# Patient Record
Sex: MALE | Race: BLACK OR AFRICAN AMERICAN | NOT HISPANIC OR LATINO | Employment: FULL TIME | URBAN - METROPOLITAN AREA
[De-identification: names, ages, dates, MRNs, and addresses within clinical notes are randomized per-mention and may not be internally consistent; named-entity substitution may affect disease eponyms.]

---

## 2020-11-18 ENCOUNTER — OFFICE VISIT (OUTPATIENT)
Dept: FAMILY MEDICINE | Facility: CLINIC | Age: 30
End: 2020-11-18
Payer: COMMERCIAL

## 2020-11-18 VITALS
OXYGEN SATURATION: 100 % | TEMPERATURE: 97.8 F | BODY MASS INDEX: 24.22 KG/M2 | SYSTOLIC BLOOD PRESSURE: 90 MMHG | RESPIRATION RATE: 18 BRPM | WEIGHT: 163.5 LBS | DIASTOLIC BLOOD PRESSURE: 60 MMHG | HEIGHT: 69 IN | HEART RATE: 40 BPM

## 2020-11-18 DIAGNOSIS — Z23 NEED FOR VACCINATION: Primary | ICD-10-CM

## 2020-11-18 PROCEDURE — 99202 OFFICE O/P NEW SF 15 MIN: CPT | Mod: 25 | Performed by: NURSE PRACTITIONER

## 2020-11-18 PROCEDURE — 90707 MMR VACCINE SC: CPT | Performed by: NURSE PRACTITIONER

## 2020-11-18 PROCEDURE — 90716 VAR VACCINE LIVE SUBQ: CPT | Performed by: NURSE PRACTITIONER

## 2020-11-18 PROCEDURE — 90471 IMMUNIZATION ADMIN: CPT | Performed by: NURSE PRACTITIONER

## 2020-11-18 PROCEDURE — 90472 IMMUNIZATION ADMIN EACH ADD: CPT | Performed by: NURSE PRACTITIONER

## 2020-11-18 RX ORDER — ISONIAZID 300 MG/1
300 TABLET ORAL DAILY
COMMUNITY
Start: 2020-09-18

## 2020-11-18 RX ORDER — PYRIDOXINE HCL (VITAMIN B6) 50 MG
50 TABLET ORAL DAILY
COMMUNITY
Start: 2020-09-18

## 2020-11-18 SDOH — HEALTH STABILITY: MENTAL HEALTH: HOW OFTEN DO YOU HAVE A DRINK CONTAINING ALCOHOL?: NEVER

## 2020-11-18 ASSESSMENT — MIFFLIN-ST. JEOR: SCORE: 1680.39

## 2020-11-18 NOTE — PROGRESS NOTES
"Subjective     Jean Pierre Marrufo is a 30 year old male who presents to clinic today for the following health issues:    HPI         Patient presents with:  Other: need to update shot immunization for work. 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 States that he has had tetanus vaccine about every ten years with last being TDAP in 9/2020. Has had influenza vaccine this season. States that he does not believe that he has had MMR or Varicella vaccines in the past. Does not have a copy of his immunization records.     Review of Systems   Constitutional, HEENT, cardiovascular, pulmonary, gi and gu systems are negative, except as otherwise noted.      Objective    BP 90/60   Pulse (!) 40   Temp 97.8  F (36.6  C) (Oral)   Resp 18   Ht 1.75 m (5' 8.9\")   Wt 74.2 kg (163 lb 8 oz)   SpO2 100%   BMI 24.22 kg/m    Body mass index is 24.22 kg/m .  Physical Exam   GENERAL: healthy, alert and no distress  EYES: Eyes grossly normal to inspection, PERRL and conjunctivae and sclerae normal  PSYCH: mentation appears normal, affect normal/bright    No results found for this or any previous visit (from the past 24 hour(s)).        Assessment & Plan     Need for vaccination  Offered patient titers to check for immunity. Patient declines, states that he does not think has has been vaccinated for MMR or varicella in the past.   - CHICKEN POX VACCINE [61232]  - MMR VIRUS IMMUNIZATION [54365]        Return in about 6 weeks (around 12/30/2020) for ancillary MMR and varicella second dose.    DESHAWN Romano [facility]    "

## 2023-02-10 ENCOUNTER — IMMUNIZATION (OUTPATIENT)
Dept: FAMILY MEDICINE | Facility: CLINIC | Age: 33
End: 2023-02-10
Payer: COMMERCIAL

## 2023-02-10 DIAGNOSIS — Z23 NEED FOR VACCINATION: ICD-10-CM

## 2023-02-10 DIAGNOSIS — Z23 HIGH PRIORITY FOR 2019-NCOV VACCINE: Primary | ICD-10-CM

## 2023-02-10 DIAGNOSIS — Z23 NEED FOR PROPHYLACTIC VACCINATION AND INOCULATION AGAINST INFLUENZA: ICD-10-CM

## 2023-02-10 PROCEDURE — 90471 IMMUNIZATION ADMIN: CPT

## 2023-02-10 PROCEDURE — 99207 PR NO CHARGE LOS: CPT

## 2023-02-10 PROCEDURE — 0011A COVID-19 VACCINE 18+ (MODERNA PRIMARY SERIES .5ML): CPT

## 2023-02-10 PROCEDURE — 91301 COVID-19 VACCINE 18+ (MODERNA PRIMARY SERIES .5ML): CPT

## 2023-02-10 PROCEDURE — 90472 IMMUNIZATION ADMIN EACH ADD: CPT

## 2023-02-10 PROCEDURE — 90686 IIV4 VACC NO PRSV 0.5 ML IM: CPT

## 2023-02-10 PROCEDURE — 90746 HEPB VACCINE 3 DOSE ADULT IM: CPT

## 2023-03-10 ENCOUNTER — IMMUNIZATION (OUTPATIENT)
Dept: FAMILY MEDICINE | Facility: CLINIC | Age: 33
End: 2023-03-10
Payer: COMMERCIAL

## 2023-03-10 DIAGNOSIS — Z23 NEED FOR VACCINATION: ICD-10-CM

## 2023-03-10 DIAGNOSIS — Z23 HIGH PRIORITY FOR 2019-NCOV VACCINE: Primary | ICD-10-CM

## 2023-03-10 PROCEDURE — 90471 IMMUNIZATION ADMIN: CPT

## 2023-03-10 PROCEDURE — 90746 HEPB VACCINE 3 DOSE ADULT IM: CPT

## 2023-03-10 PROCEDURE — 91301 COVID-19 VACCINE 18+ (MODERNA PRIMARY SERIES .5ML): CPT

## 2023-03-10 PROCEDURE — 90707 MMR VACCINE SC: CPT

## 2023-03-10 PROCEDURE — 90716 VAR VACCINE LIVE SUBQ: CPT

## 2023-03-10 PROCEDURE — 90472 IMMUNIZATION ADMIN EACH ADD: CPT

## 2023-03-10 PROCEDURE — 0012A COVID-19 VACCINE 18+ (MODERNA PRIMARY SERIES .5ML): CPT

## 2023-05-08 ENCOUNTER — HEALTH MAINTENANCE LETTER (OUTPATIENT)
Age: 33
End: 2023-05-08

## 2024-07-14 ENCOUNTER — HEALTH MAINTENANCE LETTER (OUTPATIENT)
Age: 34
End: 2024-07-14

## 2025-07-19 ENCOUNTER — HEALTH MAINTENANCE LETTER (OUTPATIENT)
Age: 35
End: 2025-07-19